# Patient Record
(demographics unavailable — no encounter records)

---

## 2025-07-29 NOTE — HISTORY OF PRESENT ILLNESS
[Y] : Patient uses contraception [N] : Patient denies prior pregnancies [Never active] : never active [FreeTextEntry1] : 19 yr old started on ocp for acne and dysmenorrhea returns for annual exam. Patinet doing great on OCP. Acne has improved and cramping has improved. Going back to college this week. No complaints. Not sexually active [LMPDate] : 07/22/25